# Patient Record
Sex: MALE | Race: BLACK OR AFRICAN AMERICAN | NOT HISPANIC OR LATINO
[De-identification: names, ages, dates, MRNs, and addresses within clinical notes are randomized per-mention and may not be internally consistent; named-entity substitution may affect disease eponyms.]

---

## 2018-05-16 PROBLEM — Z00.00 ENCOUNTER FOR PREVENTIVE HEALTH EXAMINATION: Status: ACTIVE | Noted: 2018-05-16

## 2018-05-30 ENCOUNTER — APPOINTMENT (OUTPATIENT)
Dept: SURGERY | Facility: CLINIC | Age: 23
End: 2018-05-30
Payer: MEDICAID

## 2018-05-30 VITALS
OXYGEN SATURATION: 99 % | BODY MASS INDEX: 25.26 KG/M2 | WEIGHT: 186.5 LBS | HEIGHT: 72 IN | DIASTOLIC BLOOD PRESSURE: 86 MMHG | SYSTOLIC BLOOD PRESSURE: 130 MMHG | HEART RATE: 92 BPM | RESPIRATION RATE: 16 BRPM | TEMPERATURE: 99.3 F

## 2018-05-30 DIAGNOSIS — Z78.9 OTHER SPECIFIED HEALTH STATUS: ICD-10-CM

## 2018-05-30 PROCEDURE — 99203 OFFICE O/P NEW LOW 30 MIN: CPT | Mod: 25

## 2018-05-30 PROCEDURE — 46600 DIAGNOSTIC ANOSCOPY SPX: CPT

## 2018-06-05 ENCOUNTER — OUTPATIENT (OUTPATIENT)
Dept: OUTPATIENT SERVICES | Facility: HOSPITAL | Age: 23
LOS: 1 days | End: 2018-06-05
Payer: MEDICAID

## 2018-06-05 VITALS
OXYGEN SATURATION: 100 % | HEIGHT: 72 IN | WEIGHT: 181 LBS | RESPIRATION RATE: 16 BRPM | HEART RATE: 87 BPM | DIASTOLIC BLOOD PRESSURE: 82 MMHG | TEMPERATURE: 98 F | SYSTOLIC BLOOD PRESSURE: 142 MMHG

## 2018-06-05 DIAGNOSIS — A63.0 ANOGENITAL (VENEREAL) WARTS: ICD-10-CM

## 2018-06-05 DIAGNOSIS — Z01.818 ENCOUNTER FOR OTHER PREPROCEDURAL EXAMINATION: ICD-10-CM

## 2018-06-05 LAB
ANION GAP SERPL CALC-SCNC: 16 MMOL/L — SIGNIFICANT CHANGE UP (ref 5–17)
BUN SERPL-MCNC: 9 MG/DL — SIGNIFICANT CHANGE UP (ref 7–23)
CALCIUM SERPL-MCNC: 10 MG/DL — SIGNIFICANT CHANGE UP (ref 8.4–10.5)
CHLORIDE SERPL-SCNC: 100 MMOL/L — SIGNIFICANT CHANGE UP (ref 96–108)
CO2 SERPL-SCNC: 23 MMOL/L — SIGNIFICANT CHANGE UP (ref 22–31)
CREAT SERPL-MCNC: 0.97 MG/DL — SIGNIFICANT CHANGE UP (ref 0.5–1.3)
GLUCOSE SERPL-MCNC: 94 MG/DL — SIGNIFICANT CHANGE UP (ref 70–99)
HCT VFR BLD CALC: 49.2 % — SIGNIFICANT CHANGE UP (ref 39–50)
HGB BLD-MCNC: 16.6 G/DL — SIGNIFICANT CHANGE UP (ref 13–17)
MCHC RBC-ENTMCNC: 29.3 PG — SIGNIFICANT CHANGE UP (ref 27–34)
MCHC RBC-ENTMCNC: 33.7 GM/DL — SIGNIFICANT CHANGE UP (ref 32–36)
MCV RBC AUTO: 86.9 FL — SIGNIFICANT CHANGE UP (ref 80–100)
PLATELET # BLD AUTO: 178 K/UL — SIGNIFICANT CHANGE UP (ref 150–400)
POTASSIUM SERPL-MCNC: 4.9 MMOL/L — SIGNIFICANT CHANGE UP (ref 3.5–5.3)
POTASSIUM SERPL-SCNC: 4.9 MMOL/L — SIGNIFICANT CHANGE UP (ref 3.5–5.3)
RBC # BLD: 5.66 M/UL — SIGNIFICANT CHANGE UP (ref 4.2–5.8)
RBC # FLD: 13.4 % — SIGNIFICANT CHANGE UP (ref 10.3–14.5)
SODIUM SERPL-SCNC: 139 MMOL/L — SIGNIFICANT CHANGE UP (ref 135–145)
WBC # BLD: 5.64 K/UL — SIGNIFICANT CHANGE UP (ref 3.8–10.5)
WBC # FLD AUTO: 5.64 K/UL — SIGNIFICANT CHANGE UP (ref 3.8–10.5)

## 2018-06-05 PROCEDURE — 85027 COMPLETE CBC AUTOMATED: CPT

## 2018-06-05 PROCEDURE — G0463: CPT

## 2018-06-05 PROCEDURE — 80048 BASIC METABOLIC PNL TOTAL CA: CPT

## 2018-06-05 RX ORDER — ACETAMINOPHEN 500 MG
1000 TABLET ORAL ONCE
Qty: 0 | Refills: 0 | Status: COMPLETED | OUTPATIENT
Start: 2018-06-11 | End: 2018-06-11

## 2018-06-05 RX ORDER — SODIUM CHLORIDE 9 MG/ML
3 INJECTION INTRAMUSCULAR; INTRAVENOUS; SUBCUTANEOUS EVERY 8 HOURS
Qty: 0 | Refills: 0 | Status: DISCONTINUED | OUTPATIENT
Start: 2018-06-11 | End: 2018-06-26

## 2018-06-05 RX ORDER — LIDOCAINE HCL 20 MG/ML
0.2 VIAL (ML) INJECTION ONCE
Qty: 0 | Refills: 0 | Status: DISCONTINUED | OUTPATIENT
Start: 2018-06-11 | End: 2018-06-26

## 2018-06-05 NOTE — H&P PST ADULT - PROBLEM SELECTOR PLAN 1
planned for excision and fulguration anal condyloma 6/11/18.   PST labs send  preprocedure instructions discussed

## 2018-06-05 NOTE — H&P PST ADULT - NEGATIVE GASTROINTESTINAL SYMPTOMS
no abdominal pain/no nausea/no vomiting/no change in bowel habits/no melena no melena/no vomiting/no nausea/no abdominal pain

## 2018-06-05 NOTE — H&P PST ADULT - NS PRO AD NO ADVANCE DIRECTIVE
patient wants to think about and bring the healthcare proxy form back the day of procedure patient wants to think about it and bring the healthcare proxy form back the day of procedure

## 2018-06-05 NOTE — H&P PST ADULT - NSANTHOSAYNRD_GEN_A_CORE
No. LONG screening performed.  STOP BANG Legend: 0-2 = LOW Risk; 3-4 = INTERMEDIATE Risk; 5-8 = HIGH Risk

## 2018-06-10 ENCOUNTER — TRANSCRIPTION ENCOUNTER (OUTPATIENT)
Age: 23
End: 2018-06-10

## 2018-06-10 RX ORDER — OXYCODONE HYDROCHLORIDE 5 MG/1
5 TABLET ORAL ONCE
Qty: 0 | Refills: 0 | Status: DISCONTINUED | OUTPATIENT
Start: 2018-06-11 | End: 2018-06-11

## 2018-06-10 RX ORDER — SODIUM CHLORIDE 9 MG/ML
1000 INJECTION, SOLUTION INTRAVENOUS
Qty: 0 | Refills: 0 | Status: DISCONTINUED | OUTPATIENT
Start: 2018-06-11 | End: 2018-06-26

## 2018-06-10 RX ORDER — ONDANSETRON 8 MG/1
4 TABLET, FILM COATED ORAL ONCE
Qty: 0 | Refills: 0 | Status: DISCONTINUED | OUTPATIENT
Start: 2018-06-11 | End: 2018-06-26

## 2018-06-10 RX ORDER — CELECOXIB 200 MG/1
200 CAPSULE ORAL ONCE
Qty: 0 | Refills: 0 | Status: DISCONTINUED | OUTPATIENT
Start: 2018-06-11 | End: 2018-06-26

## 2018-06-11 ENCOUNTER — OUTPATIENT (OUTPATIENT)
Dept: OUTPATIENT SERVICES | Facility: HOSPITAL | Age: 23
LOS: 1 days | End: 2018-06-11
Payer: MEDICAID

## 2018-06-11 ENCOUNTER — APPOINTMENT (OUTPATIENT)
Dept: SURGERY | Facility: HOSPITAL | Age: 23
End: 2018-06-11
Payer: MEDICAID

## 2018-06-11 ENCOUNTER — RESULT REVIEW (OUTPATIENT)
Age: 23
End: 2018-06-11

## 2018-06-11 VITALS
OXYGEN SATURATION: 99 % | SYSTOLIC BLOOD PRESSURE: 128 MMHG | HEART RATE: 82 BPM | DIASTOLIC BLOOD PRESSURE: 78 MMHG | RESPIRATION RATE: 15 BRPM

## 2018-06-11 VITALS
HEIGHT: 72 IN | SYSTOLIC BLOOD PRESSURE: 153 MMHG | DIASTOLIC BLOOD PRESSURE: 91 MMHG | TEMPERATURE: 99 F | WEIGHT: 181 LBS | OXYGEN SATURATION: 100 % | HEART RATE: 90 BPM | RESPIRATION RATE: 16 BRPM

## 2018-06-11 DIAGNOSIS — A63.0 ANOGENITAL (VENEREAL) WARTS: ICD-10-CM

## 2018-06-11 PROCEDURE — 46924 DESTRUCTION ANAL LESION(S): CPT

## 2018-06-11 PROCEDURE — 88304 TISSUE EXAM BY PATHOLOGIST: CPT

## 2018-06-11 PROCEDURE — 88304 TISSUE EXAM BY PATHOLOGIST: CPT | Mod: 26

## 2018-06-11 RX ORDER — CELECOXIB 200 MG/1
200 CAPSULE ORAL ONCE
Qty: 0 | Refills: 0 | Status: COMPLETED | OUTPATIENT
Start: 2018-06-11 | End: 2018-06-11

## 2018-06-11 RX ORDER — OXYCODONE HYDROCHLORIDE 5 MG/1
1 TABLET ORAL
Qty: 30 | Refills: 0 | OUTPATIENT
Start: 2018-06-11

## 2018-06-11 RX ADMIN — Medication 1000 MILLIGRAM(S): at 13:08

## 2018-06-11 RX ADMIN — CELECOXIB 200 MILLIGRAM(S): 200 CAPSULE ORAL at 13:08

## 2018-06-11 NOTE — ASU DISCHARGE PLAN (ADULT/PEDIATRIC). - NOTIFY
Pain not relieved by Medications/Fever greater than 101/Bleeding that does not stop Persistent Nausea and Vomiting/Unable to Urinate/Pain not relieved by Medications/Fever greater than 101/Bleeding that does not stop

## 2018-06-11 NOTE — ASU DISCHARGE PLAN (ADULT/PEDIATRIC). - SPECIAL INSTRUCTIONS
See Instruction sheet.    For mild pain, take Extra Strength Tylenol every 6 hours.  For moderate/severe pain, take oxycodone 5mg, 1 or 2 tablets every 6 hours.

## 2018-06-11 NOTE — BRIEF OPERATIVE NOTE - PROCEDURE
<<-----Click on this checkbox to enter Procedure Excision and fulguration, condyloma, anus  06/11/2018    Active  [id number]

## 2018-06-11 NOTE — ASU DISCHARGE PLAN (ADULT/PEDIATRIC). - MEDICATION SUMMARY - MEDICATIONS TO TAKE
I will START or STAY ON the medications listed below when I get home from the hospital:    oxyCODONE 5 mg oral tablet  -- 1-2  tab(s) by mouth every 6 hours, As Needed -for moderate pain - for severe pain MDD:8   -- Caution federal law prohibits the transfer of this drug to any person other  than the person for whom it was prescribed.  It is very important that you take or use this exactly as directed.  Do not skip doses or discontinue unless directed by your doctor.  May cause drowsiness.  Alcohol may intensify this effect.  Use care when operating dangerous machinery.  This prescription cannot be refilled.  Using more of this medication than prescribed may cause serious breathing problems.    -- Indication: For Condyloma acuminatum    Silvadene 1% topical cream  -- Apply on skin to anal area after every sitz bath  -- For external use only.    -- Indication: For Condyloma acuminatum

## 2018-06-18 LAB — SURGICAL PATHOLOGY STUDY: SIGNIFICANT CHANGE UP

## 2018-06-27 ENCOUNTER — APPOINTMENT (OUTPATIENT)
Dept: SURGERY | Facility: CLINIC | Age: 23
End: 2018-06-27
Payer: MEDICAID

## 2018-06-27 VITALS
SYSTOLIC BLOOD PRESSURE: 123 MMHG | TEMPERATURE: 97.9 F | DIASTOLIC BLOOD PRESSURE: 76 MMHG | RESPIRATION RATE: 16 BRPM | HEART RATE: 75 BPM | OXYGEN SATURATION: 100 %

## 2018-06-27 DIAGNOSIS — Z82.0 FAMILY HISTORY OF EPILEPSY AND OTHER DISEASES OF THE NERVOUS SYSTEM: ICD-10-CM

## 2018-06-27 PROCEDURE — 99024 POSTOP FOLLOW-UP VISIT: CPT

## 2018-06-27 RX ORDER — OXYCODONE 5 MG/1
5 TABLET ORAL
Qty: 20 | Refills: 0 | Status: DISCONTINUED | COMMUNITY
Start: 2018-06-12 | End: 2018-06-27

## 2018-06-27 RX ORDER — SILVER SULFADIAZINE 10 MG/G
1 CREAM TOPICAL TWICE DAILY
Qty: 400 | Refills: 4 | Status: DISCONTINUED | COMMUNITY
Start: 2018-06-12 | End: 2018-06-27

## 2018-08-01 ENCOUNTER — APPOINTMENT (OUTPATIENT)
Dept: SURGERY | Facility: CLINIC | Age: 23
End: 2018-08-01
Payer: MEDICAID

## 2018-08-01 VITALS
SYSTOLIC BLOOD PRESSURE: 139 MMHG | HEART RATE: 79 BPM | OXYGEN SATURATION: 100 % | DIASTOLIC BLOOD PRESSURE: 82 MMHG | RESPIRATION RATE: 16 BRPM | TEMPERATURE: 98.2 F

## 2018-08-01 PROBLEM — A63.0 ANOGENITAL (VENEREAL) WARTS: Chronic | Status: ACTIVE | Noted: 2018-06-05

## 2018-08-01 PROBLEM — F12.90 CANNABIS USE, UNSPECIFIED, UNCOMPLICATED: Chronic | Status: ACTIVE | Noted: 2018-06-05

## 2018-08-01 PROCEDURE — 99212 OFFICE O/P EST SF 10 MIN: CPT

## 2018-08-16 ENCOUNTER — APPOINTMENT (OUTPATIENT)
Dept: SURGERY | Facility: CLINIC | Age: 23
End: 2018-08-16
Payer: MEDICAID

## 2018-08-16 VITALS
RESPIRATION RATE: 15 BRPM | OXYGEN SATURATION: 100 % | DIASTOLIC BLOOD PRESSURE: 78 MMHG | HEART RATE: 64 BPM | SYSTOLIC BLOOD PRESSURE: 133 MMHG | TEMPERATURE: 98.3 F

## 2018-08-16 PROCEDURE — 99212 OFFICE O/P EST SF 10 MIN: CPT

## 2018-08-16 RX ORDER — CHLORPHENIRAMINE MALEATE 4 MG/1
4 TABLET ORAL
Qty: 10 | Refills: 0 | Status: DISCONTINUED | COMMUNITY
Start: 2018-07-24

## 2018-09-05 ENCOUNTER — OUTPATIENT (OUTPATIENT)
Dept: OUTPATIENT SERVICES | Facility: HOSPITAL | Age: 23
LOS: 1 days | End: 2018-09-05
Payer: MEDICAID

## 2018-09-05 VITALS
RESPIRATION RATE: 16 BRPM | WEIGHT: 184.09 LBS | SYSTOLIC BLOOD PRESSURE: 125 MMHG | HEART RATE: 74 BPM | DIASTOLIC BLOOD PRESSURE: 77 MMHG | TEMPERATURE: 98 F | HEIGHT: 71 IN | OXYGEN SATURATION: 98 %

## 2018-09-05 DIAGNOSIS — A63.0 ANOGENITAL (VENEREAL) WARTS: Chronic | ICD-10-CM

## 2018-09-05 DIAGNOSIS — Z01.818 ENCOUNTER FOR OTHER PREPROCEDURAL EXAMINATION: ICD-10-CM

## 2018-09-05 DIAGNOSIS — A63.0 ANOGENITAL (VENEREAL) WARTS: ICD-10-CM

## 2018-09-05 LAB
HCT VFR BLD CALC: 44.3 % — SIGNIFICANT CHANGE UP (ref 39–50)
HGB BLD-MCNC: 14.3 G/DL — SIGNIFICANT CHANGE UP (ref 13–17)
MCHC RBC-ENTMCNC: 28.7 PG — SIGNIFICANT CHANGE UP (ref 27–34)
MCHC RBC-ENTMCNC: 32.3 GM/DL — SIGNIFICANT CHANGE UP (ref 32–36)
MCV RBC AUTO: 89 FL — SIGNIFICANT CHANGE UP (ref 80–100)
PLATELET # BLD AUTO: 148 K/UL — LOW (ref 150–400)
RBC # BLD: 4.98 M/UL — SIGNIFICANT CHANGE UP (ref 4.2–5.8)
RBC # FLD: 14 % — SIGNIFICANT CHANGE UP (ref 10.3–14.5)
WBC # BLD: 5.01 K/UL — SIGNIFICANT CHANGE UP (ref 3.8–10.5)
WBC # FLD AUTO: 5.01 K/UL — SIGNIFICANT CHANGE UP (ref 3.8–10.5)

## 2018-09-05 PROCEDURE — 85027 COMPLETE CBC AUTOMATED: CPT

## 2018-09-05 PROCEDURE — G0463: CPT

## 2018-09-05 RX ORDER — LIDOCAINE HCL 20 MG/ML
0.2 VIAL (ML) INJECTION ONCE
Qty: 0 | Refills: 0 | Status: DISCONTINUED | OUTPATIENT
Start: 2018-09-11 | End: 2018-09-26

## 2018-09-05 RX ORDER — SODIUM CHLORIDE 9 MG/ML
3 INJECTION INTRAMUSCULAR; INTRAVENOUS; SUBCUTANEOUS EVERY 8 HOURS
Qty: 0 | Refills: 0 | Status: DISCONTINUED | OUTPATIENT
Start: 2018-09-11 | End: 2018-09-26

## 2018-09-05 NOTE — H&P PST ADULT - NEUROLOGICAL DETAILS
normal strength/alert and oriented x 3/sensation intact/no spontaneous movement/responds to verbal commands

## 2018-09-05 NOTE — H&P PST ADULT - HISTORY OF PRESENT ILLNESS
21 yo male, [facility] condyloma, had excision on 6/11/18, post-op visit revealed reoccurrence on different area. Pt. returns to [facility] today for Excision and Fulguration of Condyloma on 9/11/18. Pt. denies recent fever, chills, chest pain, SOB, changes in bowel/urinary habits.

## 2018-09-10 ENCOUNTER — TRANSCRIPTION ENCOUNTER (OUTPATIENT)
Age: 23
End: 2018-09-10

## 2018-09-10 RX ORDER — SODIUM CHLORIDE 9 MG/ML
1000 INJECTION, SOLUTION INTRAVENOUS
Qty: 0 | Refills: 0 | Status: DISCONTINUED | OUTPATIENT
Start: 2018-09-11 | End: 2018-09-26

## 2018-09-10 RX ORDER — ONDANSETRON 8 MG/1
4 TABLET, FILM COATED ORAL ONCE
Qty: 0 | Refills: 0 | Status: DISCONTINUED | OUTPATIENT
Start: 2018-09-11 | End: 2018-09-26

## 2018-09-10 RX ORDER — OXYCODONE HYDROCHLORIDE 5 MG/1
5 TABLET ORAL ONCE
Qty: 0 | Refills: 0 | Status: DISCONTINUED | OUTPATIENT
Start: 2018-09-11 | End: 2018-09-11

## 2018-09-10 RX ORDER — CELECOXIB 200 MG/1
200 CAPSULE ORAL ONCE
Qty: 0 | Refills: 0 | Status: DISCONTINUED | OUTPATIENT
Start: 2018-09-11 | End: 2018-09-26

## 2018-09-11 ENCOUNTER — RESULT REVIEW (OUTPATIENT)
Age: 23
End: 2018-09-11

## 2018-09-11 ENCOUNTER — APPOINTMENT (OUTPATIENT)
Dept: SURGERY | Facility: HOSPITAL | Age: 23
End: 2018-09-11
Payer: MEDICAID

## 2018-09-11 ENCOUNTER — OUTPATIENT (OUTPATIENT)
Dept: OUTPATIENT SERVICES | Facility: HOSPITAL | Age: 23
LOS: 1 days | End: 2018-09-11
Payer: MEDICAID

## 2018-09-11 VITALS
SYSTOLIC BLOOD PRESSURE: 125 MMHG | OXYGEN SATURATION: 100 % | RESPIRATION RATE: 15 BRPM | HEART RATE: 88 BPM | DIASTOLIC BLOOD PRESSURE: 76 MMHG

## 2018-09-11 VITALS
HEIGHT: 71 IN | HEART RATE: 71 BPM | RESPIRATION RATE: 16 BRPM | TEMPERATURE: 99 F | DIASTOLIC BLOOD PRESSURE: 81 MMHG | SYSTOLIC BLOOD PRESSURE: 134 MMHG | OXYGEN SATURATION: 98 % | WEIGHT: 184.09 LBS

## 2018-09-11 DIAGNOSIS — A63.0 ANOGENITAL (VENEREAL) WARTS: ICD-10-CM

## 2018-09-11 DIAGNOSIS — A63.0 ANOGENITAL (VENEREAL) WARTS: Chronic | ICD-10-CM

## 2018-09-11 PROCEDURE — 46924 DESTRUCTION ANAL LESION(S): CPT | Mod: 58

## 2018-09-11 PROCEDURE — 88304 TISSUE EXAM BY PATHOLOGIST: CPT

## 2018-09-11 PROCEDURE — 46922 EXCISION OF ANAL LESION(S): CPT

## 2018-09-11 PROCEDURE — 46910 DESTRUCTION ANAL LESION(S): CPT

## 2018-09-11 PROCEDURE — 88304 TISSUE EXAM BY PATHOLOGIST: CPT | Mod: 26

## 2018-09-11 RX ORDER — IMIQUIMOD 50 MG/G
1 CREAM TOPICAL
Qty: 0 | Refills: 0 | COMMUNITY

## 2018-09-11 RX ORDER — CELECOXIB 200 MG/1
200 CAPSULE ORAL ONCE
Qty: 0 | Refills: 0 | Status: COMPLETED | OUTPATIENT
Start: 2018-09-11 | End: 2018-09-11

## 2018-09-11 RX ORDER — ACETAMINOPHEN 500 MG
1000 TABLET ORAL ONCE
Qty: 0 | Refills: 0 | Status: COMPLETED | OUTPATIENT
Start: 2018-09-11 | End: 2018-09-11

## 2018-09-11 RX ORDER — ACETAMINOPHEN 500 MG
2 TABLET ORAL
Qty: 56 | Refills: 0 | OUTPATIENT
Start: 2018-09-11 | End: 2018-09-17

## 2018-09-11 RX ORDER — OXYCODONE HYDROCHLORIDE 5 MG/1
1 TABLET ORAL
Qty: 30 | Refills: 0 | OUTPATIENT
Start: 2018-09-11 | End: 2018-09-17

## 2018-09-11 RX ADMIN — Medication 1000 MILLIGRAM(S): at 13:48

## 2018-09-11 RX ADMIN — CELECOXIB 200 MILLIGRAM(S): 200 CAPSULE ORAL at 13:48

## 2018-09-11 NOTE — BRIEF OPERATIVE NOTE - PROCEDURE
<<-----Click on this checkbox to enter Procedure Anal surgery  09/11/2018  excision of anal condylomata  Active  GRIS

## 2018-09-11 NOTE — ASU DISCHARGE PLAN (ADULT/PEDIATRIC). - "IF YOU OR YOUR GUARDIAN/FAMILY IS A SMOKER, IT IS IMPORTANT FOR YOUR HEALTH TO STOP SMOKING. PLEASE BE AWARE THAT SECOND HAND SMOKE IS ALSO HARMFUL."
Statement Selected
Anxiety    Chronic cough    Hypothyroidism    IBS (irritable bowel syndrome)    Tracheomalacia

## 2018-09-11 NOTE — ASU DISCHARGE PLAN (ADULT/PEDIATRIC). - NOTIFY
Excessive Diarrhea/Unable to Urinate/Persistent Nausea and Vomiting/Pain not relieved by Medications/Fever greater than 101/Bleeding that does not stop
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
## 2018-09-14 LAB — SURGICAL PATHOLOGY STUDY: SIGNIFICANT CHANGE UP

## 2018-09-26 ENCOUNTER — APPOINTMENT (OUTPATIENT)
Dept: SURGERY | Facility: CLINIC | Age: 23
End: 2018-09-26
Payer: MEDICAID

## 2018-09-26 VITALS
DIASTOLIC BLOOD PRESSURE: 65 MMHG | TEMPERATURE: 98.3 F | RESPIRATION RATE: 16 BRPM | HEIGHT: 72 IN | BODY MASS INDEX: 25.19 KG/M2 | SYSTOLIC BLOOD PRESSURE: 129 MMHG | HEART RATE: 78 BPM | OXYGEN SATURATION: 99 % | WEIGHT: 186 LBS

## 2018-09-26 PROCEDURE — 99024 POSTOP FOLLOW-UP VISIT: CPT

## 2018-10-09 NOTE — PRE-ANESTHESIA EVALUATION ADULT - NSDENTALSD_ENT_ALL_CORE
appears normal and intact Transposition Flap Text: The defect edges were debeveled with a #15 scalpel blade.  Given the location of the defect and the proximity to free margins a transposition flap was deemed most appropriate.  Using a sterile surgical marker, an appropriate transposition flap was drawn incorporating the defect.    The area thus outlined was incised deep to adipose tissue with a #15 scalpel blade.  The skin margins were undermined to an appropriate distance in all directions utilizing iris scissors.

## 2018-11-28 ENCOUNTER — APPOINTMENT (OUTPATIENT)
Dept: SURGERY | Facility: CLINIC | Age: 23
End: 2018-11-28
Payer: MEDICAID

## 2018-11-28 VITALS
TEMPERATURE: 98.6 F | SYSTOLIC BLOOD PRESSURE: 126 MMHG | BODY MASS INDEX: 24.51 KG/M2 | OXYGEN SATURATION: 98 % | HEIGHT: 74 IN | WEIGHT: 191 LBS | HEART RATE: 91 BPM | DIASTOLIC BLOOD PRESSURE: 74 MMHG | RESPIRATION RATE: 19 BRPM

## 2018-11-28 PROCEDURE — 46900 DESTRUCTION ANAL LESION(S): CPT

## 2018-11-30 NOTE — H&P PST ADULT - NEGATIVE SKIN SYMPTOMS
no rash/no itching CDU NOTE JAYESH RIVERA: Pt resting comfortably, feeling well without complaint. NAD, VSS. No events on telemetry. Neuro exam normal, no deficits. CTA H&N negative, per neuro's note pt may f/u outpt. Per signout pt to stay overnight for neurochecks and telemetry. will continue monitoring overnight.

## 2018-12-06 ENCOUNTER — APPOINTMENT (OUTPATIENT)
Dept: SURGERY | Facility: CLINIC | Age: 23
End: 2018-12-06

## 2019-01-01 ENCOUNTER — OUTPATIENT (OUTPATIENT)
Dept: OUTPATIENT SERVICES | Facility: HOSPITAL | Age: 24
LOS: 1 days | End: 2019-01-01
Payer: MEDICAID

## 2019-01-01 DIAGNOSIS — A63.0 ANOGENITAL (VENEREAL) WARTS: Chronic | ICD-10-CM

## 2019-01-01 PROCEDURE — G9001: CPT

## 2019-01-09 ENCOUNTER — APPOINTMENT (OUTPATIENT)
Dept: SURGERY | Facility: CLINIC | Age: 24
End: 2019-01-09
Payer: MEDICAID

## 2019-01-10 ENCOUNTER — APPOINTMENT (OUTPATIENT)
Dept: SURGERY | Facility: CLINIC | Age: 24
End: 2019-01-10
Payer: MEDICAID

## 2019-01-10 VITALS
TEMPERATURE: 98.2 F | DIASTOLIC BLOOD PRESSURE: 77 MMHG | SYSTOLIC BLOOD PRESSURE: 130 MMHG | HEART RATE: 93 BPM | OXYGEN SATURATION: 100 % | RESPIRATION RATE: 16 BRPM

## 2019-01-10 DIAGNOSIS — Z09 ENCOUNTER FOR FOLLOW-UP EXAMINATION AFTER COMPLETED TREATMENT FOR CONDITIONS OTHER THAN MALIGNANT NEOPLASM: ICD-10-CM

## 2019-01-10 PROCEDURE — 46900 DESTRUCTION ANAL LESION(S): CPT

## 2019-01-10 NOTE — HISTORY OF PRESENT ILLNESS
[FreeTextEntry1] : Joaquin is a 22 y/o male s/p excision of anal condyloma on 9/11/18 and 6/11/18. Pathology: consistent with acutely inflamed condyloma. Last seen on 11/28/18, anal inspection demonstrated 2 separate 2 mm recurrences externally. There was one 2 mm recurrence internally. Recurrences were treated with trichloroacetic acid. Today, patient reports feeling well. Denies rectal pain or bleeding. Has normal formed BM. Using Aldara.

## 2019-01-10 NOTE — PHYSICAL EXAM
[FreeTextEntry1] : Perianal inspection and digital exam unremarkable. There was a single 8 mm posterior recurrence noted on anoscopy at the level of the dentate line.

## 2019-01-23 DIAGNOSIS — Z71.89 OTHER SPECIFIED COUNSELING: ICD-10-CM

## 2019-03-07 ENCOUNTER — APPOINTMENT (OUTPATIENT)
Dept: SURGERY | Facility: CLINIC | Age: 24
End: 2019-03-07
Payer: MEDICAID

## 2019-03-07 VITALS
SYSTOLIC BLOOD PRESSURE: 135 MMHG | RESPIRATION RATE: 15 BRPM | HEART RATE: 78 BPM | OXYGEN SATURATION: 100 % | DIASTOLIC BLOOD PRESSURE: 82 MMHG | TEMPERATURE: 98.4 F

## 2019-03-07 PROCEDURE — 46900 DESTRUCTION ANAL LESION(S): CPT

## 2019-03-07 RX ORDER — CEPHALEXIN 500 MG/1
500 CAPSULE ORAL
Qty: 14 | Refills: 0 | Status: DISCONTINUED | COMMUNITY
Start: 2019-02-13

## 2019-03-07 RX ORDER — AMOXICILLIN 500 MG/1
500 CAPSULE ORAL
Qty: 20 | Refills: 0 | Status: DISCONTINUED | COMMUNITY
Start: 2019-01-13

## 2019-03-07 NOTE — ASSESSMENT
[FreeTextEntry1] : Patient with recurrent condyloma.  Trichloroacetic acid applied. Followup 2 months.

## 2019-03-07 NOTE — PHYSICAL EXAM
[FreeTextEntry1] : Perianal inspection digital exam and anoscopy revealed a 5 mm posterior recurrence adjacent to the dentate line.

## 2019-03-07 NOTE — HISTORY OF PRESENT ILLNESS
[FreeTextEntry1] : Joaquin is a 22 y/o male here for follow up visit. Patient is s/p excision of anal condyloma on 9/11/18 and 6/11/18. Pathology: consistent with acutely inflamed condyloma. Last seen on 1/10/19, there was a single 8 mm posterior recurrence noted on anoscopy at the level of the dentate line. Recurrence treated with trichloroacetic acid. Still using Aldara

## 2019-05-08 ENCOUNTER — APPOINTMENT (OUTPATIENT)
Dept: SURGERY | Facility: CLINIC | Age: 24
End: 2019-05-08
Payer: MEDICAID

## 2019-05-08 VITALS
OXYGEN SATURATION: 100 % | RESPIRATION RATE: 16 BRPM | SYSTOLIC BLOOD PRESSURE: 136 MMHG | HEART RATE: 95 BPM | DIASTOLIC BLOOD PRESSURE: 76 MMHG | TEMPERATURE: 99.1 F

## 2019-05-08 PROCEDURE — 46600 DIAGNOSTIC ANOSCOPY SPX: CPT

## 2019-05-08 PROCEDURE — 99213 OFFICE O/P EST LOW 20 MIN: CPT | Mod: 25

## 2019-05-08 RX ORDER — IMIQUIMOD 50 MG/G
5 CREAM TOPICAL
Qty: 24 | Refills: 4 | Status: DISCONTINUED | COMMUNITY
Start: 2018-08-01 | End: 2019-05-08

## 2019-05-08 NOTE — HISTORY OF PRESENT ILLNESS
[FreeTextEntry1] : Joaquin is a 24 y/o male here for follow up visit. Patient is s/p excision of anal condyloma on 9/11/18 and 6/11/18. Pathology: consistent with acutely inflamed condyloma. Last seen on 3/7/19, patient with recurrent condyloma. Trichloroacetic acid applied.

## 2019-07-31 NOTE — HISTORY OF PRESENT ILLNESS
[FreeTextEntry1] : Joaquin is a 22 y/o male here for follow up visit. Patient is s/p excision of anal condyloma on 9/11/18 and 6/11/18. Pathology: consistent with acutely inflamed condyloma. Last seen on 5/8/19, no evidence of recurrence on exam. Instructed to follow-up in 4 months.

## 2019-08-01 ENCOUNTER — APPOINTMENT (OUTPATIENT)
Dept: SURGERY | Facility: CLINIC | Age: 24
End: 2019-08-01
Payer: MEDICAID

## 2019-08-14 ENCOUNTER — APPOINTMENT (OUTPATIENT)
Dept: SURGERY | Facility: CLINIC | Age: 24
End: 2019-08-14
Payer: MEDICAID

## 2019-08-14 VITALS
WEIGHT: 180 LBS | HEIGHT: 74 IN | SYSTOLIC BLOOD PRESSURE: 117 MMHG | RESPIRATION RATE: 16 BRPM | OXYGEN SATURATION: 99 % | HEART RATE: 85 BPM | BODY MASS INDEX: 23.1 KG/M2 | DIASTOLIC BLOOD PRESSURE: 76 MMHG | TEMPERATURE: 99 F

## 2019-08-14 PROCEDURE — 99213 OFFICE O/P EST LOW 20 MIN: CPT | Mod: 25

## 2019-08-14 PROCEDURE — 46900 DESTRUCTION ANAL LESION(S): CPT

## 2019-08-14 RX ORDER — KETOCONAZOLE 20 MG/G
2 CREAM TOPICAL
Qty: 60 | Refills: 0 | Status: DISCONTINUED | COMMUNITY
Start: 2019-08-01

## 2019-08-14 RX ORDER — FLUTICASONE PROPIONATE 50 UG/1
50 SPRAY, METERED NASAL
Qty: 16 | Refills: 0 | Status: DISCONTINUED | COMMUNITY
Start: 2019-06-29

## 2019-08-14 RX ORDER — PREDNISONE 20 MG/1
20 TABLET ORAL
Qty: 6 | Refills: 0 | Status: DISCONTINUED | COMMUNITY
Start: 2019-04-19

## 2019-08-14 RX ORDER — CLINDAMYCIN PHOSPHATE 10 MG/ML
1 SOLUTION TOPICAL
Qty: 30 | Refills: 0 | Status: DISCONTINUED | COMMUNITY
Start: 2019-08-05

## 2019-08-14 NOTE — ASSESSMENT
[FreeTextEntry1] : 3 small recurrences internal. Treated with trichloroacetic acid.Followup 3 months

## 2019-08-14 NOTE — PHYSICAL EXAM
[FreeTextEntry1] : Perianal inspection digital exam and anoscopy demonstrated 3 small internal recurrences. All successfully treated with trichloroacetic acid

## 2019-08-14 NOTE — HISTORY OF PRESENT ILLNESS
[FreeTextEntry1] : Joaquin is a 22 y/o male here for follow up visit. Patient is s/p excision of anal condyloma on 9/11/18 and 6/11/18. Pathology: consistent with acutely inflamed condyloma. Last seen on 5/8/19, no evidence of recurrence on exam. Instructed to follow-up in 4 months. Patient reports feeling well. Denies feeling any new lumps/bumps. Denies pain/bleeding/discomfort.

## 2019-09-11 ENCOUNTER — APPOINTMENT (OUTPATIENT)
Dept: SURGERY | Facility: CLINIC | Age: 24
End: 2019-09-11

## 2019-11-06 ENCOUNTER — APPOINTMENT (OUTPATIENT)
Dept: SURGERY | Facility: CLINIC | Age: 24
End: 2019-11-06
Payer: MEDICAID

## 2019-11-14 NOTE — ASU PATIENT PROFILE, ADULT - MEDICATION ADMINISTRATION INFO, PROFILE
Patient Education     Bacterial Conjunctivitis    You have an infection in the membranes covering the white part of the eye. This part of the eye is called the conjunctiva. The infection is called conjunctivitis. The most common symptoms of conjunctivitis include a thick, pus-like discharge from the eye, swollen eyelids, redness, eyelids sticking together upon awakening, and a gritty or scratchy feeling in the eye. Your infection was caused by bacteria. It may be treated with medicine. With treatment, the infection takes about 7 to 10 days to resolve.  Home care  · Use prescribed antibiotic eye drops or ointment as directed to treat the infection.  · Apply a warm compress (towel soaked in warm water) to the affected eye 3 to 4 times a day. Do this just before applying medicine to the eye.  · Use a warm, wet cloth to wipe away crusting of the eyelids in the morning. This is caused by mucus drainage during the night. You may also use saline irrigating solution or artificial tears to rinse away mucus in the eye. Do not put a patch over the eye.  · Wash your hands before and after touching the infected eye. This is to prevent spreading the infection to the other eye, and to other people. Don't share your towels or washcloths with others.  · You may use acetaminophen or ibuprofen to control pain, unless another medicine was prescribed. (Note: If you have chronic liver or kidney disease or have ever had a stomach ulcer or gastrointestinal bleeding, talk with your doctor before using these medicines.)  · Don't wear contact lenses until your eyes have healed and all symptoms are gone.  Follow-up care  Follow up with your healthcare provider, or as advised.  When to seek medical advice  Call your healthcare provider right away if any of these occur:  · Worsening vision  · Increasing pain in the eye  · Increasing swelling or redness of the eyelid  · Redness spreading around the eye  Date Last Reviewed: 7/1/2017  © 4292-6301  [employer]. [address] [place] [address], [place] [address]. All rights reserved. This information is not intended as a substitute for professional medical care. Always follow your healthcare professional's instructions.            no concerns

## 2019-11-20 ENCOUNTER — APPOINTMENT (OUTPATIENT)
Dept: SURGERY | Facility: CLINIC | Age: 24
End: 2019-11-20
Payer: MEDICAID

## 2019-11-20 VITALS
SYSTOLIC BLOOD PRESSURE: 144 MMHG | TEMPERATURE: 98.6 F | RESPIRATION RATE: 16 BRPM | DIASTOLIC BLOOD PRESSURE: 76 MMHG | HEART RATE: 85 BPM | OXYGEN SATURATION: 100 %

## 2019-11-20 PROCEDURE — 99213 OFFICE O/P EST LOW 20 MIN: CPT | Mod: 25

## 2019-11-20 PROCEDURE — 46900 DESTRUCTION ANAL LESION(S): CPT

## 2019-11-20 NOTE — PHYSICAL EXAM
[FreeTextEntry1] : Perianal inspection digital exam and anoscopy demonstrated a single internal posterior 5 mm recurrence.

## 2019-11-20 NOTE — ASSESSMENT
[FreeTextEntry1] : Single posterior internal 5 mm recurrence. Treated with trichloroacetic acid. Followup 3 months.

## 2019-11-20 NOTE — HISTORY OF PRESENT ILLNESS
[FreeTextEntry1] : Joaquin is a 25 y/o male here for follow up visit. Patient is s/p excision of anal condyloma on 9/11/18 and 6/11/18. Pathology: consistent with acutely inflamed condyloma. Last seen on 8/14/19, patient with 3 small internal recurrences. Treated with trichloroacetic acid. Patient reports feeling well. Denies pain/bleeding.\par Patient is unsure if he feels any new lumps/bumps.

## 2019-12-02 NOTE — H&P PST ADULT - MALLAMPATI CLASS
no diarrhea/no dysuria/no hematuria/no nausea/no chills/no vomiting/no fever Class II - visualization of the soft palate, fauces, and uvula

## 2020-03-05 ENCOUNTER — APPOINTMENT (OUTPATIENT)
Dept: SURGERY | Facility: CLINIC | Age: 25
End: 2020-03-05
Payer: MEDICAID

## 2020-03-05 VITALS
SYSTOLIC BLOOD PRESSURE: 139 MMHG | HEIGHT: 72 IN | DIASTOLIC BLOOD PRESSURE: 73 MMHG | WEIGHT: 170 LBS | HEART RATE: 81 BPM | OXYGEN SATURATION: 100 % | BODY MASS INDEX: 23.03 KG/M2 | TEMPERATURE: 98.3 F | RESPIRATION RATE: 16 BRPM

## 2020-03-05 PROCEDURE — 46600 DIAGNOSTIC ANOSCOPY SPX: CPT

## 2020-03-05 PROCEDURE — 99213 OFFICE O/P EST LOW 20 MIN: CPT | Mod: 25

## 2020-03-05 NOTE — HISTORY OF PRESENT ILLNESS
[FreeTextEntry1] : Joaquin is a 23 y/o male here for follow up visit. Patient is s/p excision of anal condyloma on 9/11/18 and 6/11/18. Pathology: consistent with acutely inflamed condyloma. Last seen on 11/20/19, patient with a single posterior internal 5 mm recurrence.  Treated with trichloroacetic acid.

## 2020-04-30 ENCOUNTER — APPOINTMENT (OUTPATIENT)
Dept: PULMONOLOGY | Facility: CLINIC | Age: 25
End: 2020-04-30
Payer: MEDICAID

## 2020-04-30 DIAGNOSIS — R06.00 DYSPNEA, UNSPECIFIED: ICD-10-CM

## 2020-04-30 PROCEDURE — 99204 OFFICE O/P NEW MOD 45 MIN: CPT | Mod: 95

## 2020-04-30 NOTE — HISTORY OF PRESENT ILLNESS
[Current] : current [TextBox_4] : Patient has been experiencing dyspnea for the past month. He did vape THC until 3/23, when his symptoms started. He saw another pulmonary who ordered cxr at [facility] 8 days ago which was normal.

## 2020-04-30 NOTE — ASSESSMENT
[FreeTextEntry1] : 1. Dyspnea: Patient had been vaping THC produts, suspect he had some vaping induced lung injury. Patient now with normal cxr and improving symptoms. Reassured.

## 2020-06-16 NOTE — PRE-ANESTHESIA EVALUATION ADULT - LAST CARDIAC ANGIOGRAM/IMAGING
[FreeTextEntry1] : check labs prior to yearly physical\par check COVID Ab, given prior exposure; explained that the significance of circulating antibodies is not entirely understood and it is unclear whether or not they are protective and if they are protective, for how long; even if antibodies are present, patient must continue to follow recommended guidelines such as frequent hand washing, social distancing, and using face mask when in public placed. patient verbalized understanding\par check labs to monitor gout\par continue with allopurinol\par f/u one years, sooner PRN 
denies

## 2020-07-14 NOTE — PRE-ANESTHESIA EVALUATION ADULT - HEART RATE (BEATS/MIN)
Anton Garcia called and left a message  She wanted to clarify how bad her depression is   She had told you it was an 8 or 9 but she meant that it was a 1 or 2 where 10 is the worst  71

## 2020-07-23 ENCOUNTER — APPOINTMENT (OUTPATIENT)
Dept: SURGERY | Facility: CLINIC | Age: 25
End: 2020-07-23
Payer: MEDICAID

## 2020-07-23 VITALS
TEMPERATURE: 97.9 F | DIASTOLIC BLOOD PRESSURE: 82 MMHG | RESPIRATION RATE: 15 BRPM | SYSTOLIC BLOOD PRESSURE: 145 MMHG | OXYGEN SATURATION: 100 % | HEART RATE: 82 BPM

## 2020-07-23 PROCEDURE — 99212 OFFICE O/P EST SF 10 MIN: CPT | Mod: 25

## 2020-07-23 PROCEDURE — 46600 DIAGNOSTIC ANOSCOPY SPX: CPT

## 2020-07-23 NOTE — HISTORY OF PRESENT ILLNESS
[FreeTextEntry1] : Joaquin is a 23 y/o male here for follow up visit. Patient is s/p excision of anal condyloma on 9/11/18 and 6/11/18. Pathology: consistent with acutely inflamed condyloma. Last seen on 3/5/20, no evidence of recurrence. Today, patient reports feeling well. Denies any new growths.

## 2020-11-17 ENCOUNTER — APPOINTMENT (OUTPATIENT)
Dept: SURGERY | Facility: CLINIC | Age: 25
End: 2020-11-17
Payer: MEDICAID

## 2020-11-17 VITALS
OXYGEN SATURATION: 99 % | RESPIRATION RATE: 16 BRPM | HEART RATE: 89 BPM | DIASTOLIC BLOOD PRESSURE: 73 MMHG | SYSTOLIC BLOOD PRESSURE: 150 MMHG | TEMPERATURE: 97.6 F

## 2020-11-17 PROCEDURE — 99072 ADDL SUPL MATRL&STAF TM PHE: CPT

## 2020-11-17 PROCEDURE — 46900 DESTRUCTION ANAL LESION(S): CPT

## 2020-11-17 PROCEDURE — 99213 OFFICE O/P EST LOW 20 MIN: CPT | Mod: 25

## 2020-11-17 RX ORDER — METOPROLOL TARTRATE 75 MG/1
TABLET, FILM COATED ORAL
Refills: 0 | Status: DISCONTINUED | COMMUNITY
End: 2020-11-17

## 2020-11-17 RX ORDER — LEVOCETIRIZINE DIHYDROCHLORIDE 5 MG/1
5 TABLET, FILM COATED ORAL
Refills: 0 | Status: DISCONTINUED | COMMUNITY
End: 2020-11-17

## 2020-11-17 NOTE — HISTORY OF PRESENT ILLNESS
[FreeTextEntry1] : Joaquin is a 25 y/o male s/p excision of anal condyloma on 9/11/18 and 6/11/18. Pathology: consistent with acutely inflamed condyloma. Patient last seen 7/23/20- perianal inspection digital exam and anoscopy, no evidence of reoccurrence. Patient is lactose intolerant, had some egg nog last week and had some diarrhea. Now having normal formed BMs daily. Denies BRBPR. Pain relieved. Still has some generalized anal discomfort, not associated with BM. Denies swelling, denies feeling lumps/bumps.

## 2020-12-16 ENCOUNTER — APPOINTMENT (OUTPATIENT)
Dept: SURGERY | Facility: CLINIC | Age: 25
End: 2020-12-16
Payer: MEDICAID

## 2020-12-16 NOTE — HISTORY OF PRESENT ILLNESS
[FreeTextEntry1] : Joaquin is a 24 y/o male s/p excision of anal condyloma on 9/11/18 and 6/11/18. Pathology: consistent with acutely inflamed condyloma. Last seen on 11/17/20, digital exam left lateral lesion noted. 5 mm left lateral lesion noted at dentate line on anoscopy. TCA applied.

## 2020-12-31 ENCOUNTER — APPOINTMENT (OUTPATIENT)
Dept: SURGERY | Facility: CLINIC | Age: 25
End: 2020-12-31
Payer: MEDICAID

## 2020-12-31 VITALS
OXYGEN SATURATION: 99 % | HEART RATE: 82 BPM | DIASTOLIC BLOOD PRESSURE: 82 MMHG | RESPIRATION RATE: 15 BRPM | SYSTOLIC BLOOD PRESSURE: 121 MMHG | TEMPERATURE: 96.8 F

## 2020-12-31 PROCEDURE — 46600 DIAGNOSTIC ANOSCOPY SPX: CPT

## 2020-12-31 PROCEDURE — 99072 ADDL SUPL MATRL&STAF TM PHE: CPT

## 2020-12-31 PROCEDURE — 99213 OFFICE O/P EST LOW 20 MIN: CPT | Mod: 25

## 2020-12-31 NOTE — HISTORY OF PRESENT ILLNESS
[FreeTextEntry1] : Joaquin is a 23 y/o male s/p excision of anal condyloma on 9/11/18 and 6/11/18. Pathology: consistent with acutely inflamed condyloma. Last seen on 11/17/20, digital exam left lateral lesion noted. 5 mm left lateral lesion noted at dentate line on anoscopy. Bichloracetic acid applied to condyloma. \par Today reports no new bumps, just occasional itching. Has 2 bms daily, normal form, no blood or pain.

## 2021-01-06 ENCOUNTER — APPOINTMENT (OUTPATIENT)
Dept: SURGERY | Facility: CLINIC | Age: 26
End: 2021-01-06

## 2021-03-31 ENCOUNTER — APPOINTMENT (OUTPATIENT)
Dept: SURGERY | Facility: CLINIC | Age: 26
End: 2021-03-31
Payer: MEDICAID

## 2021-03-31 VITALS
BODY MASS INDEX: 37.3 KG/M2 | DIASTOLIC BLOOD PRESSURE: 77 MMHG | WEIGHT: 190 LBS | TEMPERATURE: 97.4 F | OXYGEN SATURATION: 100 % | HEIGHT: 60 IN | HEART RATE: 76 BPM | SYSTOLIC BLOOD PRESSURE: 145 MMHG | RESPIRATION RATE: 16 BRPM

## 2021-03-31 PROCEDURE — 46600 DIAGNOSTIC ANOSCOPY SPX: CPT

## 2021-03-31 PROCEDURE — 99213 OFFICE O/P EST LOW 20 MIN: CPT | Mod: 25

## 2021-03-31 PROCEDURE — 99072 ADDL SUPL MATRL&STAF TM PHE: CPT

## 2021-03-31 NOTE — HISTORY OF PRESENT ILLNESS
[FreeTextEntry1] : Joaquin is a 25 y/o male s/p excision of anal condyloma on 9/11/18 and 6/11/18. Pathology: consistent with acutely inflamed condyloma. Last seen on 12/31/20, perianal inspection, DE and anoscopy unremarkable. No evidence of recurrence at that time. \par No complaints at this time.

## 2021-08-26 ENCOUNTER — APPOINTMENT (OUTPATIENT)
Dept: SURGERY | Facility: CLINIC | Age: 26
End: 2021-08-26
Payer: MEDICAID

## 2021-08-26 VITALS
DIASTOLIC BLOOD PRESSURE: 93 MMHG | HEART RATE: 60 BPM | RESPIRATION RATE: 17 BRPM | TEMPERATURE: 97.8 F | SYSTOLIC BLOOD PRESSURE: 146 MMHG | OXYGEN SATURATION: 98 %

## 2021-08-26 PROCEDURE — 99212 OFFICE O/P EST SF 10 MIN: CPT | Mod: 25

## 2021-08-26 PROCEDURE — 46600 DIAGNOSTIC ANOSCOPY SPX: CPT

## 2021-08-26 NOTE — HISTORY OF PRESENT ILLNESS
[FreeTextEntry1] : Joaquin is a 26 y/o male s/p excision of anal condyloma on 9/11/18 and 6/11/18. Pathology: consistent with acutely inflamed condyloma. Last seen on 3/31/21, perianal inspection, DE and anoscopy unremarkable. No evidence of recurrence at that time. \par \par Today pt reports feeling well, no pain. Pt reports for the last 5 days feeling irritation and itching. Pt reports formed BMs daily, no pain, no bleeding. Pt reports good appetite, no nausea, no vomiting.

## 2022-03-03 ENCOUNTER — APPOINTMENT (OUTPATIENT)
Dept: SURGERY | Facility: CLINIC | Age: 27
End: 2022-03-03
Payer: MEDICAID

## 2022-03-03 VITALS
TEMPERATURE: 98.1 F | RESPIRATION RATE: 18 BRPM | SYSTOLIC BLOOD PRESSURE: 139 MMHG | HEART RATE: 75 BPM | WEIGHT: 175 LBS | BODY MASS INDEX: 23.7 KG/M2 | DIASTOLIC BLOOD PRESSURE: 80 MMHG | OXYGEN SATURATION: 99 % | HEIGHT: 72 IN

## 2022-03-03 PROCEDURE — 99213 OFFICE O/P EST LOW 20 MIN: CPT | Mod: 25

## 2022-03-03 PROCEDURE — 46600 DIAGNOSTIC ANOSCOPY SPX: CPT

## 2022-03-03 NOTE — HISTORY OF PRESENT ILLNESS
[FreeTextEntry1] : Joaquin is a 27 y/o male here for follow up visit. S/p excision of anal condyloma on 9/11/18 and 6/11/18. Pathology: consistent with acutely inflamed condyloma.\par \par Last seen 8/26/21 - No recurrence. Follow-up 6 months.\par \par Today patient reports feeling well. Reports having some intermittent itching in the area. 1-2 formed BM daily, denies bleeding or pain.  Good appetite, no nausea, vomiting, fever or chills.

## 2022-03-03 NOTE — PHYSICAL EXAM
[FreeTextEntry1] : Perianal inspection digital exam and anoscopy unremarkable.  No evidence of recurrent condylomata\par \par \par \par Anoscopy performed to rule out recurrent anal condylomata.  No sedation required

## 2022-07-18 NOTE — H&P PST ADULT - WEIGHT IN KG
Chief Complaint  Annual Exam    Subjective          History of Present Illness  Jovita Camara presents to [facility] for a routine physical.  Anxiety/Depression:  Has been on Cymbalta for the last 2 years and doing well with that. Tried 60mg dose in the past of Cymbalta but it made her to sleepy so is doing well on 30mg and not having side effects.  No HI/SI.    Tremor:  Has a shaky hand on the right side. Was started on Topamax but it did not help. She had normal EMG.  Is followed by Kaycee Stoddard neuro. The tremor is worse at rest. Is to f/u if worsens.     Arthritis:  Was seen by rheum d/t elevated CRP/ESR and pos ALESSANDRA. Has not had f/u recently. No new joint pains.     Vit D Def:  Is taking vit D daily.     Rt Torn Meniscus:  Is being followed by ortho at , is in knee brace and crutches, has f/u with ortho in a week. Has not started PT yet.     Obesity:  Has been working on weight loss with nutritionist. She sees her every 6 weeks. Has lost about 20lbs. Is making diet changes. Has knee inj recently and has not been able to exercise as much lately.     Diet/exercise: seeing nutritionist, doing well with diet and working on exercise.   Eye exams: sees eye dr, is due for check up and it is scheduled, wears glasses.  Works on computer all day but takes breaks to avoid headaches.  Dental exams: sees dentist regularly.    Patient's last menstrual period was 02/22/2021. is menopausal    reports previously being sexually active and has had partner(s) who are male. She reports using the following method of birth control/protection: None.  Cancer-related family history is not on file.  Last mammogram May 2021 and it was normal.   Cologuard 1.25.22 normal  Last Pap Feb 2021, no period for the last year. Normal paps in the past. Does not have GYN that she sees.     reports that she quit smoking about 10 years ago. Her smoking use included cigarettes. She started smoking about 34 years  ago. She has a 5.75 pack-year smoking history. She has never used smokeless tobacco.     Health Maintenance   Topic Date Due   • ANNUAL PHYSICAL  Never done   • ZOSTER VACCINE (1 of 2) Never done   • HEPATITIS C SCREENING  Never done   • PAP SMEAR  Never done   • COVID-19 Vaccine (4 - Booster) 04/27/2022   • INFLUENZA VACCINE  10/01/2022   • MAMMOGRAM  06/08/2023   • COLORECTAL CANCER SCREENING  01/25/2025   • TDAP/TD VACCINES (2 - Td or Tdap) 11/26/2031   • Pneumococcal Vaccine 0-64  Aged Out       Immunization History   Administered Date(s) Administered   • COVID-19 (PFIZER) PURPLE CAP 04/10/2021, 04/30/2021, 12/27/2021   • Flu Vaccine Quad PF >36MO 12/22/2017, 11/20/2020, 11/26/2021   • Tdap 11/26/2021       Review of Systems   Constitutional: Negative for fatigue, fever and unexpected weight loss.   Eyes: Negative for visual disturbance.   Respiratory: Negative for cough, shortness of breath and wheezing.    Cardiovascular: Negative for chest pain and palpitations.   Gastrointestinal: Negative for abdominal pain, blood in stool, constipation, diarrhea, nausea and indigestion.   Endocrine: Negative for polydipsia and polyuria.   Genitourinary: Negative for dysuria and hematuria.   Musculoskeletal: Positive for arthralgias. Negative for back pain, joint swelling and myalgias.   Skin: Negative for rash.   Neurological: Negative for dizziness, syncope, headache and confusion.   Psychiatric/Behavioral: Negative for sleep disturbance and depressed mood. The patient is not nervous/anxious.        The following portions of the patient's history were reviewed and updated as appropriate: allergies, current medications, past family history, past medical history, past social history, past surgical history and problem list.    Patient Active Problem List   Diagnosis   • Tremor   • Numbness and tingling in both hands   • Osteoarthritis of multiple joints   • Class 3 severe obesity due to excess calories without serious  comorbidity with body mass index (BMI) of 60.0 to 69.9 in adult (HCC)   • Generalized anxiety disorder   • Tear of right meniscus as current injury     Allergies   Allergen Reactions   • Molds & Smuts Cough and Headache   • Seasonal Ic [Cholestatin] Other (See Comments), Cough and Headache     Amoret dust     Current Outpatient Medications on File Prior to Visit   Medication Sig Dispense Refill   • ascorbic acid (VITAMIN C) 100 MG tablet Take 100 mg by mouth Daily.     • calcium-vitamin D 250-100 MG-UNIT per tablet Take 1 tablet by mouth 2 (Two) Times a Day.     • cetirizine (zyrTEC) 10 MG tablet Take 10 mg by mouth Daily.     • diphenhydrAMINE-acetaminophen (TYLENOL PM)  MG tablet per tablet Take 1 tablet by mouth At Night As Needed for Sleep.     • DULoxetine (CYMBALTA) 30 MG capsule Take 1 capsule by mouth Daily. 90 capsule 1   • multivitamin with minerals tablet tablet Take 1 tablet by mouth Daily.     • Diclofenac Sodium (VOLTAREN) 1 % gel gel Apply 4 g topically to the appropriate area as directed 4 (Four) Times a Day As Needed (pain). 200 g 1   • diphenhydrAMINE-acetaminophen (TYLENOL PM)  MG tablet per tablet Take 1 tablet by mouth Every Night.       No current facility-administered medications on file prior to visit.     No orders of the defined types were placed in this encounter.    Past Medical History:   Diagnosis Date   • Allergic    • Anxiety    • Arthritis    • Encephalitis    • Headache    • Torn meniscus 07/2022      Past Surgical History:   Procedure Laterality Date   • ADENOIDECTOMY     • BILATERAL INSERTION OF EAR TUBES AND ADENOIDECTOMY     • TONSILLECTOMY        Family History   Problem Relation Age of Onset   • Arthritis Mother    • Hypertension Mother    • Thyroid disease Mother    • Hyperlipidemia Father    • Hypertension Father    • Bipolar disorder Sister    • Migraine headaches Sister    • Heart attack Paternal Grandmother       Social History     Socioeconomic History   •  "Marital status:    Tobacco Use   • Smoking status: Former Smoker     Packs/day: 0.25     Years: 23.00     Pack years: 5.75     Types: Cigarettes     Start date: 1988     Quit date: 12/2011     Years since quitting: 10.6   • Smokeless tobacco: Never Used   Vaping Use   • Vaping Use: Never used   Substance and Sexual Activity   • Alcohol use: Not Currently   • Drug use: Never   • Sexual activity: Not Currently     Partners: Male     Birth control/protection: None        Objective   Vital Signs:   Vitals:    07/18/22 0801   BP: 140/70   Pulse: 84   Temp: 96.4 °F (35.8 °C)   TempSrc: Temporal   SpO2: 96%   Weight: (!) 154 kg (340 lb 6.4 oz)   Height: 154.9 cm (60.98\")   PainSc: 0-No pain      Body mass index is 64.35 kg/m².  Class 3 Severe Obesity (BMI >=40). Obesity-related health conditions include the following: osteoarthritis. Obesity is improving with lifestyle modifications. BMI is is above average; BMI management plan is completed. We discussed low calorie, low carb based diet program, portion control and increasing exercise.    Physical Exam  Vitals reviewed.   Constitutional:       General: She is not in acute distress.     Appearance: Normal appearance. She is obese. She is not ill-appearing.   HENT:      Head: Normocephalic and atraumatic.      Right Ear: Tympanic membrane, ear canal and external ear normal.      Left Ear: Tympanic membrane, ear canal and external ear normal.      Mouth/Throat:      Mouth: Mucous membranes are moist.      Pharynx: No oropharyngeal exudate or posterior oropharyngeal erythema.   Eyes:      General: No scleral icterus.     Extraocular Movements: Extraocular movements intact.      Conjunctiva/sclera: Conjunctivae normal.      Pupils: Pupils are equal, round, and reactive to light.   Neck:      Thyroid: No thyromegaly.   Cardiovascular:      Rate and Rhythm: Normal rate and regular rhythm.      Pulses: Normal pulses.      Heart sounds: Normal heart sounds. No murmur " heard.  Pulmonary:      Effort: Pulmonary effort is normal. No respiratory distress.      Breath sounds: Normal breath sounds. No stridor. No wheezing, rhonchi or rales.   Abdominal:      General: Bowel sounds are normal. There is no distension.      Palpations: Abdomen is soft. There is no mass.      Tenderness: There is no abdominal tenderness. There is no guarding.   Musculoskeletal:         General: No signs of injury. Normal range of motion.      Cervical back: Normal range of motion and neck supple.      Right lower leg: No edema.      Left lower leg: No edema.   Lymphadenopathy:      Cervical: No cervical adenopathy.   Skin:     General: Skin is warm and dry.      Coloration: Skin is not jaundiced.      Findings: No rash.   Neurological:      General: No focal deficit present.      Mental Status: She is alert and oriented to person, place, and time.      Gait: Gait normal.   Psychiatric:         Mood and Affect: Mood normal.         Behavior: Behavior normal.          Result Review :                   Assessment and Plan    Diagnoses and all orders for this visit:    1. Routine general medical examination at a health care facility (Primary)    2. Class 3 severe obesity due to excess calories without serious comorbidity with body mass index (BMI) of 60.0 to 69.9 in adult (HCC)  Assessment & Plan:  Patient's (Body mass index is 64.35 kg/m².) indicates that they are morbidly obese (BMI > 40 or > 35 with obesity - related health condition) with health conditions that include osteoarthritis . Weight is improving with lifestyle modifications. BMI is is above average; BMI management plan is completed. We discussed low calorie, low carb based diet program, portion control and increasing exercise.  Cont to see nutritionist.       3. Generalized anxiety disorder  Assessment & Plan:  Chronic, stable, cont Cymbalta 30mg      4. Osteoarthritis of multiple joints, unspecified osteoarthritis type  Assessment & Plan:  F/u  with rheum as dir      5. Tear of right meniscus as current injury, subsequent encounter  Assessment & Plan:  F/u with ortho as scheduled            Follow Up   Return in about 6 months (around 1/18/2023) for anxiety, labs.    Counseled on health maintenance topics and preventative care recommendations. Follow up yearly for routine physical exam. Diet and exercise counseling given. See dentist and eye doctor yearly as directed.    If a referral was made please contact our office if you have not heard about an appointment in the next 2 weeks.   If labs or images are ordered we will contact you with the results within the next week.  If you have not heard from us after a week please call our office to inquire about the results.    Fatoumata Foreman PA-C    Patient was given instructions and counseling regarding her condition or for health maintenance advice. Please see specific information pulled into the AVS if appropriate.     * Please note that portions of this note were completed with a voice recognition program.    82.1

## 2022-12-08 ENCOUNTER — APPOINTMENT (OUTPATIENT)
Dept: SURGERY | Facility: CLINIC | Age: 27
End: 2022-12-08

## 2022-12-08 VITALS
DIASTOLIC BLOOD PRESSURE: 80 MMHG | SYSTOLIC BLOOD PRESSURE: 149 MMHG | HEART RATE: 93 BPM | TEMPERATURE: 97.2 F | RESPIRATION RATE: 17 BRPM | OXYGEN SATURATION: 99 %

## 2022-12-08 PROCEDURE — 46600 DIAGNOSTIC ANOSCOPY SPX: CPT

## 2022-12-08 PROCEDURE — 99213 OFFICE O/P EST LOW 20 MIN: CPT | Mod: 25

## 2022-12-08 RX ORDER — LIDOCAINE HYDROCHLORIDE 20 MG/ML
2 SOLUTION ORAL; TOPICAL
Qty: 200 | Refills: 0 | Status: DISCONTINUED | COMMUNITY
Start: 2022-09-17

## 2022-12-08 NOTE — PHYSICAL EXAM
"AUDIOLOGY REPORT    SUBJECTIVE:  China Guzman is a 89 year old female who was seen in the Audiology Clinic at [place] for an audiologic evaluation, referred by Dr. Trimble. The patient has been seen previously in this clinic on 3/7/2013 for assessment and results indicated mild to moderately-severe sensorineural hearing loss in the left ear and moderately-severe to severe mixed hearing loss in the right ear. The patient reports \"I haven't heard for a long time out of the right ear and the left ear is not the best\".  She reported one to one conversation is okay but struggles with distant and soft speech in addition to group situations.  She uses the left ear on the phone and does okay. The patient denies bilateral tinnitus, bilateral otalgia, bilateral drainage, bilateral aural fullness, josh-surgeries, and vertigo. She reported use of hearing protection at work when asked about noise exposure.       OBJECTIVE:  Otoscopic exam indicates ears are clear of cerumen bilaterally     Pure Tone Thresholds assessed using conventional audiometry with good reliability from 250-8000 Hz bilaterally using insert earphones     RIGHT:  severe primarily sensorineural hearing loss    LEFT:    moderate to severe sensorineural hearing loss  Note asymmetry from 250-4000 Hz.      Tympanogram:    RIGHT: normal eardrum mobility    LEFT:   normal eardrum mobility    Reflexes (reported by stimulus ear):  RIGHT: Ipsilateral is absent at frequencies tested  RIGHT: Contralateral is present at lower then expected sensation levels  LEFT:   Ipsilateral is present at lower then expected sensation levels  LEFT:   Contralateral is absent at frequencies tested    Speech Reception Threshold:    RIGHT: 75 dB HL    LEFT:   55 dB HL    Word Recognition Score:     RIGHT: 60% at 95 dB HL using NU-6 recorded half word list.    LEFT:   40% at 75 dB HL using NU-6 recorded half word list.     Binaural: 80% at 80/80 dB HL using NU-6 recorded word " list      ASSESSMENT:   Asymmetrical sensorineural hearing loss.     Compared to patient's previous audiogram dated 3/7/2013, hearing has worsened by 10-20 dB HL in the left ear at 500, 1000, and 8000 and in the right ear from 250-4000 Hz. Today s results were discussed with the patient in detail.     PLAN: It is recommended that the patient follow up with ENT for asymmetrical sensorineural hearing loss. Retest per ENT or if symptoms worsen. Patient was counseled regarding hearing loss and impact on communication. Patient is a good candidate for amplification at this time. A [facility] information packet was given to the patient. Please call this clinic with questions regarding these results or recommendations.      Zhane Mcadams, F-AAA   Clinical Audiologist, [place] #[id number]   6/23/2017         [FreeTextEntry1] : Perianal inspection digital exam and anoscopy unremarkable.\par \par \par Anoscopy performed to rule out internal condylomata.  No sedation required.

## 2022-12-08 NOTE — HISTORY OF PRESENT ILLNESS
[FreeTextEntry1] : Joaquin is a 27 years old male here for a 9 month follow up visit.\par \par 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\par \par Last seen 3/3/22 - Perianal inspection digital exam and anoscopy unremarkable. No evidence of recurrent condylomata.  Anoscopy performed to rule out recurrent anal condylomata. No sedation required. \par \par Today pt reports no pain. Daily BMs, formed, no straining, denies pain, no bleeding, no episodes of incontinence, and denies feeling swollen or prolapsed tissue. Denies nausea and vomiting. Denies fever and chills. Good appetite. Not taking any anticoagulants.

## 2023-01-24 NOTE — ASU DISCHARGE PLAN (ADULT/PEDIATRIC). - PROCEDURE
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 Received a call that there was a cat on the ground floor. Came in contact with the cat and when trying to remove the animal it scratched/bit the patient to the right hand and wrist. Roughly 10 small scratches/puncture wounds (difficult to differentiate).     Denies [facility]  
Excision of anal condylomata

## 2023-03-27 NOTE — PRE-ANESTHESIA EVALUATION ADULT - MALLAMPATI CLASS
[facility]   Physical Therapy Treatment Note     Name: Tawny ESTRADA [facility] Number: [id number]    Therapy Diagnosis:   Encounter Diagnoses   Name Primary?    Decreased strength, endurance, and mobility Yes    Decreased range of motion     Gait abnormality        Physician: Maya Rivas*    Visit Date: 3/27/2023    Physician Orders: PT Eval and Treat  Medical Diagnosis from Referral: Sciatica of left side  Evaluation Date: 2/6/2023  Authorization Period Expiration: 12/31/23  Plan of Care Expiration: 4/6/23  Progress Note Due: 4/6/2023  Visit # / Visits authorized: 8/40 (+1)  FOTO: 2/3 (last performed on 3/6/2023)  PTA Visit #: 0/5     Precautions: Diabetes, cancer, and anxiety, HTN    Time In: 4:35 pm  Time Out: 5:15 pm  Total Billable Time: 38 minutes   (Billing reflects 1 on 1 treatment time spent with patient)    SUBJECTIVE     Patient reports: she did notice that she felt better after last visits and while taking muscle relaxers for her neck- since .  Encouraged patient to follow up with MD regarding neck pain.  She reports relief today with manual therapy     She was compliant with home exercise program.    Response to previous treatment: feeling pain in her lateral noticed some numbness in her lateral hip and anterior left thigh     Functional change: prolonged sleeping on her let side causes hip numbness, sitting for about 60 minutes causes increased symptoms, working in her garden, and now able to stand and cook a meal (30 minutes). Walking more confidently up/down community surfaces    Pain: 2-3/10     Location: left hip and LOWER EXTREMITY (no pain today)    OBJECTIVE     Objective Measures updated at progress report only unless specified.      TREATMENT     Tawny received the treatments listed below:     MANUAL THERAPY TECHNIQUES were applied for (8) minutes, including:    Manual Intervention Performed Today    Soft Tissue Mobilization  [x] left left glutes and  piriformis, adductors, unable to tolerate proximal hip flexor   Joint Mobilizations []     []     []    Functional Dry Needling  []      Plan for Next Visit: Continue as needed     ,   THERAPEUTIC EXERCISES to develop strength, endurance, ROM, flexibility, posture, and core stabilization for (14) minutes including: including objective measurements     Intervention Performed Today    Nustep for ROM and strength x 7 minutes, level 2 for endurance   Heel slides with strap (home exercise program)  3 x 10, 5 second holds   Lower trunk rotations (home exercise program)  2 x 10, 5 second holds   Short arc quads with posterior pelvic tilt   3 x 10 bilateral    Hip abduction with band x 3min 10 sec hold red band (increased)     Hip adduction with ball  x 3min with 5  second hold (increased)   Sciatic nerve glides x 5 pumps, 3 sets          Plan for Next Visit:         NEUROMUSCULAR RE-EDUCATION ACTIVITIES to improve Balance, Coordination, Kinesthetic, Sense, Proprioception, and Posture for (16) minutes.  The following were included:    Intervention Performed Today    Quad sets  3 x 10, 3-5 second holds, bilateral     Pelvic tilts (home exercise program)  3 x 10,    Bridges (home exercise program)  2 x 10   clamshells x 15x sets   Straight leg raise with posterior pelvic tilt  x 3 x 5 bilaterally   March in place supine x 2 x 10 bilateral (increased)   Sidelying hip abduction  x 5x each side   Step ups x 10x/ 2 sets 8 inch   Sit to stand x 5x / 2 sets 5#KB     Plan for Next Visit: progress as tolerated       PATIENT EDUCATION AND HOME EXERCISES     Home Exercises Provided and Patient Education Provided     Education provided:   PURPOSE: Patient educated on the impairments noted above and the effects of physical therapy intervention to improve overall condition and QOL.   EXERCISE: Patient was educated on all the above exercise prior/during/after for proper posture, positioning, and execution for safe performance with home  exercise program.   STRENGTH: Patient educated on the importance of improved core and extremity strength in order to improve alignment of the spine and extremities with static positions and dynamic movement.   GAIT & BALANCE: Patient educated on the importance of strong core and lower extremity musculature in order to improve both static and dynamic balance, improve gait mechanics, reduce fall risk and improve household and community mobility.   POSTURE: Patient educated on postural awareness to reduce stress and maintain optimal alignment of the spine with static positions and dynamic movement     Written Home Exercises Provided: yes.  Exercises were reviewed and Tawny was able to demonstrate them prior to the end of the session.  Tawny demonstrated good  understanding of the education provided. See EMR under Patient Instructions for exercises provided during therapy sessions.    ASSESSMENT     Patient tolerated treatment well and reported less pain after last treatment session, she was encouraged to follow up with MD regarding chronic neck pain and involuntary positioning of neck with LE exertion.  Patient does require cues to avoid substitution with all interventions.  Good effort throughout treatment session.    Tawny is progressing well towards her goals.   Pt prognosis is Good.     Pt will continue to benefit from skilled outpatient physical therapy to address the deficits listed in the problem list box on initial evaluation, provide pt/family education and to maximize pt's level of independence in the home and community environment.     Pt's spiritual, cultural and educational needs considered and pt agreeable to plan of care and goals.     Anticipated Barriers for therapy: sedentary lifestyle, chronicity of condition, lack of understanding of condition, adherence to treatment plan, and coping style,  Anxiety or Panic Disorders, Arthritis, Back pain, BMI over 30, Cancer, DiabetesType I or II, Prior  Surgery, Sleep dysfunction      Goals:  Reviewed: 3/27/2023      Short Term Goals: In 4 weeks   1.Patient to be educated on HEP. MET 3/6/2023  2.Patient to increase knee range of motion to full extension, in order to improve available range of motion for ADL's.    3.Patient to increase bilateral LE strength by 1/2 grade, in order to improve endurance and increase ability to perform all functional activities for increased time. MET 3/6/2023  4.Patient to have pain less than 5/10 at worst, to improve QOL. MET 3/6/2023  5.Patient to improve score on the FOTO, to improve QOL. MET 3/6/2023  6. Patient to improve score on 30 sec sit to stand, 5x sit to stand  in order to decrease fall risk. MET 3/6/2023     Long Term Goals: In 8 weeks  1.Patient to improve score on the FOTO to 48% or less, to improve QOL. PROGRESSING 3/6/2023  2. Patient to increase bilateral LE strength to 4+/5 or greater, in order to improve endurance and increase ability to perform all functional activities for increased time. PROGRESSING 3/6/2023  3. Patient to have decreased pain to 2/10 at worst, to improve QOL. PROGRESSING 3/6/2023  4. Patient to normalize score on 30 sec sit to stand, 5x sit to stand, in order to improve endurance and decrease fall risk. PROGRESSING 3/6/2023  5. Patient to perform daily activities including: PROGRESSING 3/6/2023   Putting on your shoes or socks - A little bit of difficulty  Getting into or out of a car - A little bit of difficulty  Rolling over in bed. - A little bit of difficulty  Getting into or out of the bath - A little bit of difficulty  Sitting for 1 hour. - A little bit of difficulty  Getting up or down 10 stairs (about 1 flight of stairs) - Moderate difficulty  Standing for 1 hour - Moderate difficulty  Squatting - Quite a bit of difficulty  Walking a mile - Quite a bit of difficulty  With your usual hobbies, recreational or sporting activities - Quite a bit of difficulty.      PLAN     Monitor response to  today's treatment session and progress as indicated.    2/6/2023 (evaluation): Outpatient Physical Therapy 2 times weekly for 8 weeks to include any combination of the following interventions: virtual visits, electrical stimulation (PRN), Aquatic Therapy, Gait Training, Manual Therapy, Neuromuscular Re-ed, Patient Education, Self Care, Therapeutic Exercise, Therapeutic Activites, Dry Needling, and Moist Hot Pack/Cold Pack.     Christiane Muller, PT                   Class I (easy) - visualization of the soft palate, fauces, uvula, and both anterior and posterior pillars

## 2023-05-31 ENCOUNTER — APPOINTMENT (OUTPATIENT)
Dept: SURGERY | Facility: CLINIC | Age: 28
End: 2023-05-31
Payer: MEDICAID

## 2023-05-31 VITALS
RESPIRATION RATE: 17 BRPM | HEIGHT: 72 IN | DIASTOLIC BLOOD PRESSURE: 92 MMHG | OXYGEN SATURATION: 98 % | SYSTOLIC BLOOD PRESSURE: 165 MMHG | TEMPERATURE: 98.7 F | BODY MASS INDEX: 23.7 KG/M2 | HEART RATE: 66 BPM | WEIGHT: 175 LBS

## 2023-05-31 PROCEDURE — 46600 DIAGNOSTIC ANOSCOPY SPX: CPT

## 2023-05-31 PROCEDURE — 99213 OFFICE O/P EST LOW 20 MIN: CPT | Mod: 25

## 2023-05-31 NOTE — PHYSICAL EXAM
[FreeTextEntry1] : Perianal inspection digital exam and anoscopy no evidence of recurrent condyloma.\par \par Anoscopy performed to evaluate possibility of recurrent internal condylomata.  No sedation required.

## 2023-05-31 NOTE — HISTORY OF PRESENT ILLNESS
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 Follow-up 1 year. \par \par Today pt reports no pain. Daily BMs, 2 times formed, no straining, denies pain, no bleeding, no episodes of incontinence, and denies feeling swollen or prolapsed tissue.  Denies nausea and vomiting. Denies fever and chills. Good appetite.  Not taking any anticoagulants.  On May 24th found some light pink drain.

## 2023-06-27 NOTE — H&P PST ADULT - GASTROINTESTINAL DETAILS
Detail Level: Detailed Size Of Lesion: 0.3mm Size Of Lesion: 0.2mm no masses palpable/no distention/soft/bowel sounds normal/nontender

## 2023-12-07 ENCOUNTER — APPOINTMENT (OUTPATIENT)
Dept: SURGERY | Facility: CLINIC | Age: 28
End: 2023-12-07

## 2024-01-01 NOTE — H&P PST ADULT - RS GEN PE MLT RESP DETAILS PC
Left message for father, attempting to schedule a 4-month well appt. for the patient. Instructed him to call back when he is able to in order to schedule a 4-month well for the patient.    
no rhonchi/clear to auscultation bilaterally/no wheezes/no rales/respirations non-labored

## 2024-03-24 NOTE — PHYSICAL EXAM
[FreeTextEntry1] : Perianal inspection unremarkable.  Digital exam left lateral lesion noted.  5 mm left lateral lesion noted at dentate line on anoscopy
Statement Selected

## 2024-05-29 ENCOUNTER — APPOINTMENT (OUTPATIENT)
Dept: SURGERY | Facility: CLINIC | Age: 29
End: 2024-05-29
Payer: MEDICAID

## 2024-05-29 VITALS
HEIGHT: 72 IN | BODY MASS INDEX: 25.73 KG/M2 | DIASTOLIC BLOOD PRESSURE: 70 MMHG | OXYGEN SATURATION: 98 % | RESPIRATION RATE: 18 BRPM | TEMPERATURE: 96.3 F | HEART RATE: 61 BPM | SYSTOLIC BLOOD PRESSURE: 134 MMHG | WEIGHT: 190 LBS

## 2024-05-29 DIAGNOSIS — A63.0 ANOGENITAL (VENEREAL) WARTS: ICD-10-CM

## 2024-05-29 PROCEDURE — 46600 DIAGNOSTIC ANOSCOPY SPX: CPT

## 2024-05-29 PROCEDURE — 99213 OFFICE O/P EST LOW 20 MIN: CPT | Mod: 25

## 2024-05-29 NOTE — HISTORY OF PRESENT ILLNESS
[FreeTextEntry1] : Joaquin is a 27 years old male here for a yearly follow up visit.  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  Last seen 05/31/23 - Perianal inspection digital exam and anoscopy no evidence of recurrent condyloma.  Anoscopy performed to evaluate possibility of recurrent internal condylomata. No sedation required.  Today reports no new bumps or itchiness. Regular BMs 3 times daily, strains not taking any stool softener or laxatives, no blood or pain.  Pt endorses 1 week of bloating and gassiness.

## 2024-05-29 NOTE — PHYSICAL EXAM
[Normal Breath Sounds] : Normal breath sounds [Normal Heart Sounds] : normal heart sounds [Alert] : alert [Oriented to Person] : oriented to person [Oriented to Place] : oriented to place [Oriented to Time] : oriented to time [Calm] : calm [de-identified] : WNL [de-identified] : WNL [de-identified] : KIML [de-identified] : WNL [de-identified] : WNL [FreeTextEntry1] : Perianal inspection digital exam and anoscopy no evidence of recurrence.   Anoscopy performed to evaluate anal canal.  No sedation required.

## 2024-07-03 ENCOUNTER — APPOINTMENT (OUTPATIENT)
Dept: SURGERY | Facility: CLINIC | Age: 29
End: 2024-07-03
Payer: MEDICAID

## 2024-07-03 VITALS
TEMPERATURE: 97.1 F | HEART RATE: 59 BPM | RESPIRATION RATE: 17 BRPM | OXYGEN SATURATION: 98 % | DIASTOLIC BLOOD PRESSURE: 77 MMHG | SYSTOLIC BLOOD PRESSURE: 147 MMHG

## 2024-07-03 VITALS — SYSTOLIC BLOOD PRESSURE: 132 MMHG | DIASTOLIC BLOOD PRESSURE: 86 MMHG | HEART RATE: 61 BPM

## 2024-07-03 DIAGNOSIS — A63.0 ANOGENITAL (VENEREAL) WARTS: ICD-10-CM

## 2024-07-03 PROCEDURE — 99213 OFFICE O/P EST LOW 20 MIN: CPT | Mod: 25

## 2024-07-03 PROCEDURE — 46600 DIAGNOSTIC ANOSCOPY SPX: CPT

## 2025-07-23 ENCOUNTER — APPOINTMENT (OUTPATIENT)
Dept: SURGERY | Facility: CLINIC | Age: 30
End: 2025-07-23
Payer: MEDICAID

## 2025-07-23 VITALS — BODY MASS INDEX: 24.75 KG/M2 | WEIGHT: 182.5 LBS

## 2025-07-23 VITALS
SYSTOLIC BLOOD PRESSURE: 123 MMHG | TEMPERATURE: 95.7 F | DIASTOLIC BLOOD PRESSURE: 70 MMHG | RESPIRATION RATE: 16 BRPM | HEIGHT: 72 IN | OXYGEN SATURATION: 94 % | HEART RATE: 66 BPM

## 2025-07-23 DIAGNOSIS — A63.0 ANOGENITAL (VENEREAL) WARTS: ICD-10-CM

## 2025-07-23 PROCEDURE — 46600 DIAGNOSTIC ANOSCOPY SPX: CPT
